# Patient Record
Sex: MALE | Race: WHITE | Employment: OTHER | URBAN - METROPOLITAN AREA
[De-identification: names, ages, dates, MRNs, and addresses within clinical notes are randomized per-mention and may not be internally consistent; named-entity substitution may affect disease eponyms.]

---

## 2018-01-13 ENCOUNTER — APPOINTMENT (OUTPATIENT)
Dept: GENERAL RADIOLOGY | Age: 48
End: 2018-01-13
Attending: EMERGENCY MEDICINE
Payer: COMMERCIAL

## 2018-01-13 ENCOUNTER — HOSPITAL ENCOUNTER (EMERGENCY)
Age: 48
Discharge: HOME | End: 2018-01-13
Attending: EMERGENCY MEDICINE
Payer: COMMERCIAL

## 2018-01-13 VITALS
OXYGEN SATURATION: 100 % | WEIGHT: 245 LBS | RESPIRATION RATE: 18 BRPM | HEART RATE: 89 BPM | BODY MASS INDEX: 33.18 KG/M2 | DIASTOLIC BLOOD PRESSURE: 99 MMHG | SYSTOLIC BLOOD PRESSURE: 137 MMHG | TEMPERATURE: 98 F | HEIGHT: 72 IN

## 2018-01-13 DIAGNOSIS — R07.89 CHEST WALL PAIN: Primary | ICD-10-CM

## 2018-01-13 LAB
ATRIAL RATE: 95 BPM
P AXIS: 25 DEGREES
P-R INTERVAL: 154 MS
Q-T INTERVAL: 340 MS
QRS DURATION: 88 MS
QTC CALCULATION (BEZET): 427 MS
R AXIS: 49 DEGREES
T AXIS: 4 DEGREES
VENTRICULAR RATE: 95 BPM

## 2018-01-13 PROCEDURE — 93010 ELECTROCARDIOGRAM REPORT: CPT

## 2018-01-13 PROCEDURE — 99285 EMERGENCY DEPT VISIT HI MDM: CPT

## 2018-01-13 PROCEDURE — 93005 ELECTROCARDIOGRAM TRACING: CPT

## 2018-01-13 PROCEDURE — 71101 X-RAY EXAM UNILAT RIBS/CHEST: CPT | Performed by: EMERGENCY MEDICINE

## 2018-01-13 PROCEDURE — 99284 EMERGENCY DEPT VISIT MOD MDM: CPT

## 2018-01-13 NOTE — ED PROVIDER NOTES
Patient Seen in: THE [facility] Emergency Department In [place]    History   Patient presents with:  Trauma (cardiovascular, musculoskeletal)    Stated Complaint: LEFT RIB PAIN    HPI    Patient is a 14-year-old male presents with left-sided chest pain.   Ca anterior left sided chest wall. No crepitance or warm knee. Easily reproduce pain  Abdomen: Soft and nontender throughout. No rebound or guarding  Extremities: No clubbing/cyanosis/edema. Skin: No rashes, no pallor  Neuro: Awake oriented ×3.   Nonfocal.

## (undated) NOTE — ED AVS SNAPSHOT
Ash Hamiltonrylan   MRN: [id number]    Department:  THE [facility] Emergency Department in [place]   Date of Visit:  1/13/2018           Disclosure     Insurance plans vary and the physician(s) referred by the ER may not be covered by your plan.  Please contact your tell this physician (or your personal doctor if your instructions are to return to your personal doctor) about any new or lasting problems. The primary care or specialist physician will see patients referred from the [facility] Emergency Department.  Chikis Ellis